# Patient Record
Sex: MALE | Race: AMERICAN INDIAN OR ALASKA NATIVE
[De-identification: names, ages, dates, MRNs, and addresses within clinical notes are randomized per-mention and may not be internally consistent; named-entity substitution may affect disease eponyms.]

---

## 2018-09-17 ENCOUNTER — HOSPITAL ENCOUNTER (EMERGENCY)
Dept: HOSPITAL 5 - ED | Age: 23
Discharge: HOME | End: 2018-09-17
Payer: SELF-PAY

## 2018-09-17 VITALS — DIASTOLIC BLOOD PRESSURE: 70 MMHG | SYSTOLIC BLOOD PRESSURE: 129 MMHG

## 2018-09-17 DIAGNOSIS — Z48.01: Primary | ICD-10-CM

## 2018-09-17 DIAGNOSIS — T14.8XXD: ICD-10-CM

## 2018-09-17 NOTE — EMERGENCY DEPARTMENT REPORT
Suture/Staple Removal





- [facility]


Chief Complaint: Laceration/Recheck/Suture


Stated Complaint: SUTURES REMOVAL


Time Seen by Provider: 09/17/18 14:22


When Sutures or Staples Placed: 8-10 Days Ago


Wound Location: right arm





ED Review of Systems


ROS: 


Stated complaint: SUTURES REMOVAL


Other details as noted in HPI





Constitutional: denies: chills, fever


Eyes: denies: eye pain, eye discharge, vision change


ENT: denies: ear pain, throat pain


Respiratory: denies: cough, shortness of breath, wheezing


Cardiovascular: denies: chest pain, palpitations


Endocrine: no symptoms reported


Gastrointestinal: denies: abdominal pain, nausea, diarrhea


Genitourinary: denies: urgency, dysuria


Musculoskeletal: denies: back pain, joint swelling, arthralgia


Skin: denies: rash, lesions


Neurological: denies: headache, weakness, paresthesias


Psychiatric: denies: anxiety, depression


Hematological/Lymphatic: denies: easy bleeding, easy bruising





ED Past Medical Hx





- Past Medical History


Previous Medical History?: No





- Surgical History


Past Surgical History?: No





- Social History


Smoking Status: Never Smoker


Substance Use Type: None





- Medications


Home Medications: 


 Home Medications











 Medication  Instructions  Recorded  Confirmed  Last Taken  Type


 


No Known Home Medications [No  09/06/18 09/06/18 Unknown History





Reported Home Medications]     














Suture Removal Exam





- Exam


General: 


Vital signs noted. No distress. Alert and acting appropriately.





GENERAL: The patient is a well-developed, well-nourished in no apparent 

distress. Patient is alert and acting appropriately for age.  Alert and 

oriented 3, no apparent distress, normal gait, atraumatic.





HEENT: Head is normocephalic and atraumatic. PERRL, Extraocular muscles are 

intact. Pupils are equal, round, and reactive to light and accommodation. Nares 

appeared normal. Mouth is well hydrated and without lesions. Mucous membranes 

are moist. Posterior pharynx clear of any exudate or lesions.  Mouth is well 

hydrated and without lesions.  Tonsils not erythematous or swollen.  Uvula 

midline.  Tongue elevated.  Mucous members are moist.  Posterior pharynx clear, 

no exudate or lesions.  Patent airways.


 


NECK: Supple. No carotid bruits. No lymphadenopathy or thyromegaly.nontender. 

No meningitic signs are noted.


 


LUNGS: Clear to auscultation. Non labor breathing. No intercostal retractions.  

Symmetrical with respiration, no wheezing, no rales, or crackles.


 


HEART: Regular rate and rhythm without murmur, rubs or gallops. No 

reproducible.  S1, S2 present, regular rate and rhythm without murmur, no rubs, 

no gallops.


 


ABDOMEN: Soft, nontender, and nondistended.  Positive bowel sounds.  No 

hepatosplenomegaly was noted. No guarding or rebound tenderness, negative 

epigastric bruit. Negative psoas sign, negative cabrales sign, negative McBurneys 

sign


 


EXTREMITIES: Without any cyanosis, clubbing, rash, lesions or edema. Peripheral 

pulses intact. Capillary refill less than 2 seconds.  Full range of motion 

bilaterally.


 


NEUROLOGIC: Cranial nerves II through XII are grossly intact. Alert and 

oriented x 3. Normal gait. Symmetrical strength and sensation. Reflexes 2+ 

throughout. Cerebellar testing normal. GCS score of 15.


 


PSYCHIATRIC: Normal affect with no suicidal or homicidal ideations.





Skin: total of 17 suture present with no dehiscence. No pus or drainge. No 

cellulitits or abscess.


Wound: No Pathologic Erythema, No Tenderness, No Drainage, No Pus, No Wound 

Dehiscence


Other Systems: 


All other systems reviewed and are unremarkable.








ED Course


 Vital Signs











  09/17/18





  12:21


 


Temperature 99.3 F


 


Pulse Rate 98 H


 


Respiratory 16





Rate 


 


Blood Pressure 129/70


 


O2 Sat by Pulse 100





Oximetry 














- Reevaluation(s)


Reevaluation #1: 





09/17/18 14:39


Patient is speaking in full sentences with no signs of distress noted.





ED Recheck MDM





- Medical Decision Making





Total 17 sutures has been removed.  The patient tolerated well.  The patient 

was referred to Follow-up with a primary care doctor in 3-5 days or if symptoms 

worsen and continue return to emergency room as soon as possible.  At time of 

discharge, the patient does not seem toxic or ill in appearance.  No acute 

signs of distress noted.  Patient agrees to discharge treatment plan of care.  

No further questions noted by the patient.


Critical care attestation.: 


If time is entered above; I have spent that time in minutes in the direct care 

of this critically ill patient, excluding procedure time.








ED Disposition


Clinical Impression: 


 Visit for suture removal





Disposition: DC-01 TO HOME OR SELFCARE


Is pt being admited?: No


Does the pt Need Aspirin: No


Condition: Stable


Instructions:  Suture Removal (ED)


Additional Instructions: 


Follow-up with a primary care doctor in 3-5 days or if symptoms worsen and 

continue return to emergency room as soon as possible. 


Referrals: 


PRIMARY CARE,MD [Referring] - 3-5 Days


MADELINE BOWEN MD [Staff Physician] - 3-5 Days


[facility] [Outside] - 3-5 Days


Forms:  Work/School Release Form(ED)

## 2021-05-10 NOTE — EMERGENCY DEPARTMENT REPORT
- General


Chief Complaint: Laceration/Recheck/Suture


Stated Complaint: RT HAND INJURY


Time Seen by Provider: 05/10/21 19:59


Source: patient


Mode of arrival: Ambulatory


Limitations: No Limitations





- History of Present Illness


Initial Comments: 





Patient is a 26-year-old male presents emergency room complaints of a right hand

laceration that occurred just prior to arrival.  Patient states that he was 

moving a glass mirror.  He states that it dropped and the glass mirror broke.  

He is still able to move the hand and the digits.  He is unsure of his last 

tetanus immunization Upon chart review it appears he had a tetanus in 2018.  He 

denies any numbness or weakness.  He states initially there was bleeding but it 

has since improved after placing gauze.  No past medical history.  No allergies 

medications.





- Related Data


                                Home Medications











 Medication  Instructions  Recorded  Confirmed  Last Taken


 


No Known Home Medications [No  09/06/18 09/06/18 Unknown





Reported Home Medications]    











                                    Allergies











Allergy/AdvReac Type Severity Reaction Status Date / Time


 


No Known Allergies Allergy   Verified 09/06/18 02:04














ED Review of Systems


ROS: 


Stated complaint: RT HAND INJURY


Other details as noted in HPI





Comment: All other systems reviewed and negative





ED Past Medical Hx





- Past Medical History


Previous Medical History?: No





- Social History


Smoking Status: Never Smoker


Substance Use Type: None





- Medications


Home Medications: 


                                Home Medications











 Medication  Instructions  Recorded  Confirmed  Last Taken  Type


 


No Known Home Medications [No  09/06/18 09/06/18 Unknown History





Reported Home Medications]     














ED Physical Exam





- General


Limitations: No Limitations


General appearance: alert, in no apparent distress





- Head


Head exam: Present: atraumatic, normocephalic





- Eye


Eye exam: Present: normal appearance





- ENT


ENT exam: Present: mucous membranes moist





- Respiratory


Respiratory exam: Absent: respiratory distress, accessory muscle use





- Extremities Exam


Extremities exam: Present: other (3.5 cm irregular, jagged laceration present to

the dorsal surface of the right hand overlying the 4th and 5th MCP joints, FROM 

of the right wrist, hand and digits, no active bleeding, no visualized foreign 

body, neurovascularly intact, no muscle/tendon involvement)





- Neurological Exam


Neurological exam: Present: alert, oriented X3





- Psychiatric


Psychiatric exam: Present: normal affect, normal mood





- Skin


Skin exam: Present: warm, dry





ED Course


                                   Vital Signs











  05/10/21 05/10/21





  19:55 23:20


 


Temperature 98.1 F 


 


Pulse Rate 98 H 89


 


Respiratory 16 17





Rate  


 


Blood Pressure 140/102 


 


Blood Pressure  140/83





[Right]  


 


O2 Sat by Pulse 99 100





Oximetry  














- Laceration /Wound Repair


  ** Right Dorsal Hand


Wound Location: upper extremity (right dorsal hand)


Wound Length (cm): 3 (3.5 cm total )


Wound's Depth, Shape: irregular


Wound Explored: clean


Irrigated w/ Saline (ccs): 100


Betadine Prep?: Yes


Anesthesia: 1% Lidocaine


Volume Anesthetic (ccs): 8


Wound Debrided: moderate


Wound Repaired With: sutures


Suture Size/Type: 4:0, proline


Number of Sutures: 8


Layer Closure?: No


Sterile Dressing Applied?: Yes


Progress: 





Verbal consent obtained risk and alternatives discussed





Irrigated with saline, no foreign body visualized or palpable, no muscle or 

tendon involvement, thoroughly scrubbed with Betadine, 8 cc of 1% lidocaine 

without epinephrine used anesthetic, Betadine prep again, sterile drapes 

applied, sterile gloves worn, 4-0 Prolene used for skin closure, 8 sutures 

placed, patient tolerated well, bleeding controlled, no complications, sterile 

dressing applied





ED Medical Decision Making





- Lab Data








                                   Vital Signs











  05/10/21 05/10/21





  19:55 23:20


 


Temperature 98.1 F 


 


Pulse Rate 98 H 89


 


Respiratory 16 17





Rate  


 


Blood Pressure 140/102 


 


Blood Pressure  140/83





[Right]  


 


O2 Sat by Pulse 99 100





Oximetry  














- Radiology Data


Radiology results: report reviewed





Ordering Physician: GUERRERO LEVY  


Date of Service: 05/10/21  


Procedure(s): XR hand 3+V RT  


Accession Number(s): [id number]  


 


cc: GUERRERO LEVY   


 


Fluoro Time In Minutes:   


 


RIGHT HAND 3 VIEWS  


 


 INDICATION / CLINICAL INFORMATION: Right hand injury. Cut with glass mirror 

along the bases of the 


little and ring fingers.  


 


 COMPARISON: None available.  


 


 FINDINGS:  


 


 BONES / JOINT(S): No acute fracture or subluxation. No significant arthritis.  


 


 SOFT TISSUES: There is a bandage overlying the metacarpophalangeal region 

medially. Soft tissue 


irregularity is consistent with a laceration. I do not identify a radiopaque 

foreign body.  


 


 ADDITIONAL FINDINGS: None.  


 


 


 


 Signer Name: Brannon Roe MD   


 Signed: 5/10/2021 8:31 PM  


 Workstation Name: [id number]   


 


 


Transcribed By: RT  


Dictated By: Brannon Roe MD  


Electronically Authenticated By: Brannon Roe MD    


Signed Date/Time: 05/10/21 2031                                


 


 


 


DD/DT: 05/10/21 2030                                                            

  


TD/TT:























Print





- Medical Decision Making





Patient is a 26-year-old male presents emergency room complaints of a right hand

 laceration that occurred just prior to arrival.  Patient states that he was 

moving a glass mirror.  He states that it dropped and the glass mirror broke.  

He is still able to move the hand and the digits.  He is unsure of his last 

tetanus immunization Upon chart review it appears he had a tetanus in 2018.  He 

denies any numbness or weakness.  He states initially there was bleeding but it 

has since improved after placing gauze.  No past medical history.  No allergies 

medications.  Vitals are stable.  On exam:3.5 cm irregular, jagged laceration 

present to the dorsal surface of the right hand overlying the 4th and 5th MCP 

joints, FROM of the right wrist, hand and digits, no active bleeding, no 

visualized foreign body, neurovascularly intact, no muscle/tendon involvement.  

X-ray right hand: BONES / JOINT(S): No acute fracture or subluxation. No 

significant arthritis.  


 SOFT TISSUES: There is a bandage overlying the metacarpophalangeal region 

medially. Soft tissue irregularity is consistent with a laceration. I do not 

identify a radiopaque foreign body.  ADDITIONAL FINDINGS: None.  Laceration 

repaired per procedure note without complications.  Advised patient Please keep 

area clean, dry, covered.  Wash with antibacterial soap and water and pat dry.  

No hot tub, no pool, no soaking in water.  Showering is fine.  Sutures need to 

be removed in 10 to 14 days.  Follow-up with your primary care doctor for 

reexamination.  Return to emergency room immediately for any new or worsening 

symptoms.


Critical care attestation.: 


If time is entered above; I have spent that time in minutes in the direct care 

of this critically ill patient, excluding procedure time.








ED Disposition


Clinical Impression: 


Laceration of right hand


Qualifiers:


 Encounter type: initial encounter Foreign body presence: without foreign body 

Qualified Code(s): S61.411A - Laceration without foreign body of right hand, 

initial encounter





Disposition: DC-01 TO HOME OR SELFCARE


Is pt being admited?: No


Does the pt Need Aspirin: No


Condition: Stable


Instructions:  Laceration Care, Adult, Sutures, Staples, or Adhesive Wound 

Closure, Easy-to-Read


Additional Instructions: 


Please keep area clean, dry, covered.  Wash with antibacterial soap and water 

and pat dry.  No hot tub, no pool, no soaking in water.  Showering is fine.  

Sutures need to be removed in 10 to 14 days.  Follow-up with your primary care 

doctor for reexamination.  Return to emergency room immediately for any new or 

worsening symptoms.


Referrals: 


PRIMARY CARE,MD [Primary Care Provider] - 3-5 Days


Time of Disposition: 22:46


Print Language: ENGLISH

## 2021-05-10 NOTE — XRAY REPORT
RIGHT HAND 3 VIEWS



INDICATION / CLINICAL INFORMATION: Right hand injury. Cut with glass mirror along the bases of the li
ttle and ring fingers.



COMPARISON: None available.

 

FINDINGS:



BONES / JOINT(S): No acute fracture or subluxation. No significant arthritis.



SOFT TISSUES: There is a bandage overlying the metacarpophalangeal region medially. Soft tissue irreg
ularity is consistent with a laceration. I do not identify a radiopaque foreign body.



ADDITIONAL FINDINGS: None.







Signer Name: Brannon Roe MD 

Signed: 5/10/2021 8:31 PM

Workstation Name: [id number]